# Patient Record
Sex: MALE | Race: WHITE | NOT HISPANIC OR LATINO | URBAN - METROPOLITAN AREA
[De-identification: names, ages, dates, MRNs, and addresses within clinical notes are randomized per-mention and may not be internally consistent; named-entity substitution may affect disease eponyms.]

---

## 2021-02-23 ENCOUNTER — EMERGENCY (EMERGENCY)
Facility: HOSPITAL | Age: 36
LOS: 0 days | Discharge: HOME | End: 2021-02-23
Attending: EMERGENCY MEDICINE | Admitting: EMERGENCY MEDICINE
Payer: COMMERCIAL

## 2021-02-23 VITALS
HEART RATE: 80 BPM | TEMPERATURE: 99 F | DIASTOLIC BLOOD PRESSURE: 77 MMHG | OXYGEN SATURATION: 100 % | RESPIRATION RATE: 18 BRPM | HEIGHT: 67 IN | WEIGHT: 182.1 LBS | SYSTOLIC BLOOD PRESSURE: 132 MMHG

## 2021-02-23 DIAGNOSIS — K64.9 UNSPECIFIED HEMORRHOIDS: ICD-10-CM

## 2021-02-23 DIAGNOSIS — Z79.899 OTHER LONG TERM (CURRENT) DRUG THERAPY: ICD-10-CM

## 2021-02-23 DIAGNOSIS — K64.8 OTHER HEMORRHOIDS: ICD-10-CM

## 2021-02-23 PROCEDURE — 99283 EMERGENCY DEPT VISIT LOW MDM: CPT

## 2021-02-23 RX ORDER — LIDOCAINE/HYDROCORTISONE AC 3 %-0.5 %
1 CREAM WITH APPLICATOR RECTAL
Qty: 10 | Refills: 0
Start: 2021-02-23 | End: 2021-02-27

## 2021-02-23 RX ORDER — LIDOCAINE HCL 20 MG/ML
10 VIAL (ML) INJECTION ONCE
Refills: 0 | Status: COMPLETED | OUTPATIENT
Start: 2021-02-23 | End: 2021-02-23

## 2021-02-23 RX ADMIN — Medication 10 MILLILITER(S): at 03:35

## 2021-02-23 NOTE — ED PROVIDER NOTE - OBJECTIVE STATEMENT
36 y.o M w/ pmhx constipation p/w hemorrhoid that he felt since Thursday. Pt has had these in the past and they go away spontaneously after a couple of days. This time, the pain has been persistent and pt now unable to sleep. No abd pain, no blood in stool, no n/v, no fevers. Pt states he has tried creams and suppositories without relief.

## 2021-02-23 NOTE — ED PROVIDER NOTE - CARE PROVIDER_API CALL
Bryan Hendricks)  ColonRectal Surgery  [address] [place] [address], [address] [place] [address]  Phone: [phone]  Fax: [phone]  Follow Up Time: 1-3 Days

## 2021-02-23 NOTE — ED PROVIDER NOTE - PATIENT PORTAL LINK FT
You can access the FollowMyHealth Patient Portal offered by [facility] by registering at the following website: [email or web address]. By joining [email or web address]’s FollowMyHealth portal, you will also be able to view your health information using other applications (apps) compatible with our system.

## 2021-02-23 NOTE — ED PROVIDER NOTE - NSFOLLOWUPINSTRUCTIONS_ED_ALL_ED_FT
WHAT YOU NEED TO KNOW:    A thrombosed hemorrhoid happens when blood clots become trapped inside your hemorrhoid. It is a common complication of hemorrhoids. Your hemorrhoid may suddenly look swollen or blue and feel very painful.     DISCHARGE INSTRUCTIONS:    Return to the emergency department if:   •Your pain does not get better after you take medicine for pain.  •You have heavy bleeding from your anus that fills 1 or more sanitary pads in 1 hour.    Contact your healthcare provider if:   •You have a fever.   •You have pus or a foul-smelling odor coming from your incision.   •You have questions or concerns about your condition or care.    Medicines: You may need any of the following:   •Medicine may be given to decrease pain, swelling, and itching. The medicine may come as a pad, cream, or ointment.   •Acetaminophen decreases pain and fever. It is available without a doctor's order. Ask how much to take and how often to take it. Follow directions. Acetaminophen can cause liver damage if not taken correctly.  •Stool softeners help treat or prevent constipation.   •NSAIDs, such as ibuprofen, help decrease swelling, pain, and fever. NSAIDs can cause stomach bleeding or kidney problems in certain people. If you take blood thinner medicine, always ask your healthcare provider if NSAIDs are safe for you. Always read the medicine label and follow directions.  •Take your medicine as directed. Contact your healthcare provider if you think your medicine is not helping or if you have side effects. Tell him or her if you are allergic to any medicine. Keep a list of the medicines, vitamins, and herbs you take. Include the amounts, and when and why you take them. Bring the list or the pill bottles to follow-up visits. Carry your medicine list with you in case of an emergency.    Care for your wound as directed: Do the following if your healthcare provider has made an incision in your hemorrhoid:   •Remove your bandage in 6 hours or as directed.   •Ask your healthcare provider if you need to replace your bandage. If he tells you to, pat dry the area after your sitz bath. Put on new, clean bandages as directed. Change your bandages when they get wet or dirty. Wear a sanitary pad to absorb bleeding.     Keep your anal area clean: Gently wash the area with warm water daily. Soap may irritate the area. After a bowel movement, wipe with moist towelettes or wet toilet paper. Dry toilet paper can irritate the area.     Take a sitz bath as directed: A sitz bath can help decrease pain and swelling, and help keep the area clean. Take a sitz bath 3 times a day, and after each bowel movement. Fill a bathtub with 4 to 6 inches of warm water. You may also use a sitz bath pan that fits inside a toilet bowl. Sit in the sitz bath for 15 minutes.     Apply ice on your anus for 15 to 20 minutes every hour or as directed: Use an ice pack, or put crushed ice in a plastic bag. Cover it with a towel before you apply it to your anus. Ice helps prevent tissue damage and decreases swelling and pain.    Prevent hemorrhoids:   •Do not strain to have a bowel movement. Do not sit on the toilet too long. These actions increase pressure in your rectum and anus.   •Drink plenty of liquids. Liquids can help prevent constipation. Ask how much liquid to drink each day and which liquids are best for you.   •Eat a variety of high-fiber foods. Examples include fruits, vegetables, and whole grains. Ask your healthcare provider how much fiber you need each day. You may need to take a fiber supplement.   •Exercise as directed. Exercise, such as walking, may make it easier to have a bowel movement. Ask your healthcare provider to help you create an exercise plan.   •Avoid heavy lifting. This can cause straining and increase your risk for another thrombosed hemorrhoid.     Follow up with your healthcare provider as directed: Write down your questions so you remember to ask them during your visits.

## 2021-02-23 NOTE — ED PROVIDER NOTE - PROGRESS NOTE DETAILS
BI: thrombosed hemorrhoid outside of window for excision > 96 hrs. Pt given lidocaine cream here and prescribed applicator to pharmacy. Instructed to follow up with Colorectal surgery as pt has had this problem in the past.

## 2021-02-23 NOTE — ED PROVIDER NOTE - PHYSICAL EXAMINATION
CONSTITUTIONAL: well-appearing, in NAD  SKIN: Warm dry, normal skin turgor  HEAD: NCAT  EYES: EOMI, PERRLA, no scleral icterus, conjunctiva pink  ABD: soft, non-tender, non-distended, no rebound or guarding.  RECTAL: chaperoned by NNAMDI Bowser: thrombosed tender hemorrhoid, no active bleeding, no ulcers.  EXT: Full ROM  NEURO: normal motor. normal sensory. Normal gait.  PSYCH: Cooperative, appropriate.

## 2021-02-23 NOTE — ED ADULT NURSE NOTE - OBJECTIVE STATEMENT
patient complaints of hemorrhoids are painful x 4 day.  Patient reports diarrhea but denies bleeding.

## 2021-02-23 NOTE — ED PROVIDER NOTE - CLINICAL SUMMARY MEDICAL DECISION MAKING FREE TEXT BOX
37 yo M, hx of recurrent hemorrhoid here for assessment of pain to hemorrhoid x 5 days -- not improving with anusol suppositories. No fever, chills.    VS normal, patient in no distress, has small thrombosed hemorrhoid, no signs of rectal or anal abscess, clear lungs, RRR, soft, NT, ND abdomen.    Hemorrhoid is greater than 72 hours thrombosed, therefore will not excise.    Advised on topical lido, sitz baths, hydrocortisone suppositories, colorectal follow up.

## 2021-02-25 PROBLEM — Z00.00 ENCOUNTER FOR PREVENTIVE HEALTH EXAMINATION: Status: ACTIVE | Noted: 2021-02-25

## 2021-03-02 ENCOUNTER — APPOINTMENT (OUTPATIENT)
Dept: SURGERY | Facility: CLINIC | Age: 36
End: 2021-03-02
Payer: COMMERCIAL

## 2021-03-02 VITALS
HEART RATE: 63 BPM | HEIGHT: 67 IN | SYSTOLIC BLOOD PRESSURE: 110 MMHG | WEIGHT: 185 LBS | DIASTOLIC BLOOD PRESSURE: 68 MMHG | BODY MASS INDEX: 29.03 KG/M2 | TEMPERATURE: 97.7 F

## 2021-03-02 DIAGNOSIS — K64.5 PERIANAL VENOUS THROMBOSIS: ICD-10-CM

## 2021-03-02 DIAGNOSIS — K64.9 UNSPECIFIED HEMORRHOIDS: ICD-10-CM

## 2021-03-02 PROBLEM — Z78.9 OTHER SPECIFIED HEALTH STATUS: Chronic | Status: ACTIVE | Noted: 2021-02-25

## 2021-03-02 PROCEDURE — 99203 OFFICE O/P NEW LOW 30 MIN: CPT

## 2021-03-02 PROCEDURE — 99072 ADDL SUPL MATRL&STAF TM PHE: CPT

## 2021-03-02 NOTE — PHYSICAL EXAM
[Excoriation] : excoriations [Fistula] : no fistulas [Wart] : no warts [Ulcer ___ cm] : no ulcers [Pilonidal Sinus Draining] : no pilonidal sinus drainage [Tender, Swollen] : tender, swollen [Thrombosed] : that was thrombosed [Skin Tags] : residual hemorrhoidal skin tags were noted [Normal] : was normal [None] : there was no rectal mass  [Calm] : calm [de-identified] : Healed pilonidal incision [de-identified] : Healthy male, NAD [de-identified] : Full range of motion [de-identified] : No focal deficits.

## 2021-03-02 NOTE — ASSESSMENT
[FreeTextEntry1] : Mr. WILSON as the resolving thrombosed external hemorrhoid. He is instructed to continue using a cream for one more week. In addition continue the stool softeners and drink plenty of water. He'll return to see me in 3 weeks or certainly sooner if any problems arise.

## 2021-03-23 ENCOUNTER — APPOINTMENT (OUTPATIENT)
Dept: SURGERY | Facility: CLINIC | Age: 36
End: 2021-03-23

## 2021-06-25 ENCOUNTER — TRANSCRIPTION ENCOUNTER (OUTPATIENT)
Age: 36
End: 2021-06-25

## 2021-12-13 ENCOUNTER — EMERGENCY (EMERGENCY)
Facility: HOSPITAL | Age: 36
LOS: 0 days | Discharge: HOME | End: 2021-12-13
Attending: EMERGENCY MEDICINE | Admitting: EMERGENCY MEDICINE
Payer: COMMERCIAL

## 2021-12-13 VITALS
TEMPERATURE: 98 F | DIASTOLIC BLOOD PRESSURE: 85 MMHG | OXYGEN SATURATION: 99 % | HEART RATE: 64 BPM | WEIGHT: 181 LBS | RESPIRATION RATE: 18 BRPM | HEIGHT: 67 IN | SYSTOLIC BLOOD PRESSURE: 135 MMHG

## 2021-12-13 DIAGNOSIS — M79.645 PAIN IN LEFT FINGER(S): ICD-10-CM
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
## 2021-12-13 DIAGNOSIS — M79.642 PAIN IN LEFT HAND: ICD-10-CM
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
## 2021-12-13 PROCEDURE — 99283 EMERGENCY DEPT VISIT LOW MDM: CPT

## 2021-12-13 PROCEDURE — 73130 X-RAY EXAM OF HAND: CPT | Mod: 26,LT

## 2021-12-13 NOTE — ED PROVIDER NOTE - NSFOLLOWUPINSTRUCTIONS_ED_ALL_ED_FT
Please follow up with an orthopedist in 1-2 days.  Please use ibuprofen or acetaminophen as needed for pain.  Please return to the emergency department if you have worsening pain, change in color or feeling to your fingers, or any other symptoms.      Hand Pain    Many things can cause hand pain. Some common causes are:  •An injury.  •Repeating the same movement with your hand over and over (overuse).  •Osteoporosis.  •Arthritis.  •Lumps in the tendons or joints of the hand and wrist (ganglion cysts).  •Nerve compression syndromes (carpal tunnel syndrome).  •Inflammation of the tendons (tendinitis).  •Infection.    Follow these instructions at home:    Pay attention to any changes in your symptoms. Take these actions to help with your discomfort:    Managing pain, stiffness, and swelling   •Take over-the-counter and prescription medicines only as told by your health care provider.  •Wear a hand splint or support as told by your health care provider.  •If directed, put ice on the affected area:  •Put ice in a plastic bag.  •Place a towel between your skin and the bag.  •Leave the ice on for 20 minutes, 2–3 times a day.    Activity   •Take breaks from repetitive activity often.  •Avoid activities that make your pain worse.  •Minimize stress on your hands and wrists as much as possible.  •Do stretches or exercises as told by your health care provider.  • Do not do activities that make your pain worse.    Contact a health care provider if:  •Your pain does not get better after a few days of self-care.  •Your pain gets worse.  •Your pain affects your ability to do your daily activities.    Get help right away if:  •Your hand becomes warm, red, or swollen.  •Your hand is numb or tingling.  •Your hand is extremely swollen or deformed.  •Your hand or fingers turn white or blue.  •You cannot move your hand, wrist, or fingers.    Summary  •Many things can cause hand pain.  •Contact your health care provider if your pain does not get better after a few days of self care.  •Minimize stress on your hands and wrists as much as possible.  • Do not do activities that make your pain worse.    This information is not intended to replace advice given to you by your health care provider. Make sure you discuss any questions you have with your health care provider.

## 2021-12-13 NOTE — ED PROVIDER NOTE - PROGRESS NOTE DETAILS
ADDENDUM by Janis Rodriguez M.D.: I received sign-off from Dr. ART Tariq. 36yoM with L 4th MCP pain during judo, I was to f/u Xray. Pt confirms same hx to me, states happened 2 days ago, mild pain only when fully flexed. On exam, NAD, well appearing, sitting comfortably in chair, no WOB, full finger flex/extension/adduction, nml warmth/color/sensation, no specific TTP/stepoff/deformity. Xray unremarkable. Was placed finger splint and pt declines, states he will take it off anyway. ACE wrapped. Patient is well appearing, NAD, afebrile, hemodynamically stable. Any available tests and studies were discussed with patient. Discharged with instructions in further symptomatic care, return precautions, and need for ortho f/u.

## 2021-12-13 NOTE — ED PROVIDER NOTE - CARE PROVIDER_API CALL
Joesph Loya)  Orthopaedic Surgery  [address] [place] [address]  Phone: [phone]  Fax: [phone]  Follow Up Time: 1-3 Days

## 2021-12-13 NOTE — ED PROVIDER NOTE - PHYSICAL EXAMINATION
VSS, awake, alert, no skin lacerations or abrasions. LUE; no shoulder, elbow, wrist tenderness, hand; appears symmetrical, no gross deformity, crepitus, swelling, ttp over 4th MCP, no warmth, erythema, induration, fluctuance, lymphangitis or purulence, ROM intact, no pain with passive ROM, compartments non-tense, sensory and motor function to radial, median and ulnar nerve intact. AB/AD duction, flexion, extension of digits and opposition of thumb normal, NV intact, capillary refill <3 seconds, 2+ radial pulses, pain not out of proportion to exam not appreciated.

## 2021-12-13 NOTE — ED PROVIDER NOTE - OBJECTIVE STATEMENT
35 y/o male no sig PMH p/w left 4th MCP pain x 2 days after engaging in Judo, sx are constant, worse with certain movements and position, better with rest, denies fever, tactile temp, chills, paresthesias, focal weakness, or other associated complaints at present.     Old chart reviewed.  I have reviewed and agree with the initial nursing note, except as documented in my note.

## 2021-12-13 NOTE — ED PROVIDER NOTE - PATIENT PORTAL LINK FT
You can access the FollowMyHealth Patient Portal offered by [facility] by registering at the following website: [email or web address]. By joining [employer]’s FollowMyHealth portal, you will also be able to view your health information using other applications (apps) compatible with our system.

## 2022-01-28 ENCOUNTER — EMERGENCY (EMERGENCY)
Facility: HOSPITAL | Age: 37
LOS: 0 days | Discharge: HOME | End: 2022-01-29
Attending: EMERGENCY MEDICINE | Admitting: EMERGENCY MEDICINE
Payer: COMMERCIAL

## 2022-01-28 VITALS
WEIGHT: 181 LBS | HEART RATE: 55 BPM | TEMPERATURE: 97 F | RESPIRATION RATE: 18 BRPM | SYSTOLIC BLOOD PRESSURE: 110 MMHG | HEIGHT: 67 IN | OXYGEN SATURATION: 100 % | DIASTOLIC BLOOD PRESSURE: 74 MMHG

## 2022-01-28 DIAGNOSIS — K64.4 RESIDUAL HEMORRHOIDAL SKIN TAGS: ICD-10-CM

## 2022-01-28 DIAGNOSIS — K64.9 UNSPECIFIED HEMORRHOIDS: ICD-10-CM

## 2022-01-28 PROCEDURE — 99283 EMERGENCY DEPT VISIT LOW MDM: CPT

## 2022-01-29 RX ORDER — IBUPROFEN 200 MG
800 TABLET ORAL ONCE
Refills: 0 | Status: COMPLETED | OUTPATIENT
Start: 2022-01-29 | End: 2022-01-29

## 2022-01-29 RX ORDER — LIDOCAINE/HYDROCORTISONE AC 3 %-0.5 %
1 CREAM WITH APPLICATOR RECTAL
Qty: 10 | Refills: 0
Start: 2022-01-29 | End: 2022-02-02

## 2022-01-29 RX ADMIN — Medication 800 MILLIGRAM(S): at 01:13

## 2022-01-29 NOTE — ED PROVIDER NOTE - NSFOLLOWUPCLINICS_GEN_ALL_ED_FT
[facility]  Surgery  [address] [place] [address]  Phone: [phone]  Fax:   Follow Up Time: 1-3 Days

## 2022-01-29 NOTE — ED PROVIDER NOTE - NS ED ROS FT
Constitutional: (-) fever  Eyes/ENT: (-) blurry vision, (-) epistaxis  Cardiovascular: (-) chest pain, (-) syncope  Respiratory: (-) cough, (-) shortness of breath  Gastrointestinal: (-) vomiting, (-) diarrhea, (+) hemorrhoids  Musculoskeletal: (-) neck pain, (-) back pain, (-) joint pain  Integumentary: (-) rash, (-) edema  Neurological: (-) headache, (-) altered mental status  Psychiatric: (-) hallucinations  Allergic/Immunologic: (-) pruritus

## 2022-01-29 NOTE — ED PROVIDER NOTE - PROGRESS NOTE DETAILS
Discussed conservative treatment with pt vs incision. Hemorrhoid is small, will do conservative treatment as this was successful in the past. Lido/hydrocortisone ointment sent to pharmacy. Surgery clinic follow up. Ready for dc.

## 2022-01-29 NOTE — ED PROVIDER NOTE - NSFOLLOWUPINSTRUCTIONS_ED_ALL_ED_FT
Hemorrhoids    WHAT YOU NEED TO KNOW:    Hemorrhoids are swollen blood vessels inside your rectum (internal hemorrhoids) or on your anus (external hemorrhoids). Sometimes a hemorrhoid may prolapse. This means it extends out of your anus.    DISCHARGE INSTRUCTIONS:    Return to the emergency department if:   •You have severe pain in your rectum or around your anus.  •You have severe pain in your abdomen and you are vomiting.   •You have bleeding from your anus that soaks through your underwear.     Contact your healthcare provider if:   •You have frequent and painful bowel movements.  •Your hemorrhoid looks or feels more swollen than usual.   •You do not have a bowel movement for 2 days or more.   •You see or feel tissue coming through your anus.   •You have questions or concerns about your condition or care.    Medicines: You may need any of the following:   •A pad, cream, or ointment can help decrease pain, swelling, and itching.   •Stool softeners help treat or prevent constipation.   •NSAIDs, such as ibuprofen, help decrease swelling, pain, and fever. NSAIDs can cause stomach bleeding or kidney problems in certain people. If you take blood thinner medicine, always ask your healthcare provider if NSAIDs are safe for you. Always read the medicine label and follow directions.  •Take your medicine as directed. Contact your healthcare provider if you think your medicine is not helping or if you have side effects. Tell him or her if you are allergic to any medicine. Keep a list of the medicines, vitamins, and herbs you take. Include the amounts, and when and why you take them. Bring the list or the pill bottles to follow-up visits. Carry your medicine list with you in case of an emergency.    Manage your symptoms:   •Apply ice on your anus for 15 to 20 minutes every hour or as directed. Use an ice pack, or put crushed ice in a plastic bag. Cover it with a towel before you apply it to your anus. Ice helps prevent tissue damage and decreases swelling and pain.  •Take a sitz bath. Fill a bathtub with 4 to 6 inches of warm water. You may also use a sitz bath pan that fits inside a toilet bowl. Sit in the sitz bath for 15 minutes. Do this 3 times a day, and after each bowel movement. The warm water can help decrease pain and swelling.   •Keep your anal area clean. Gently wash the area with warm water daily. Soap may irritate the area. After a bowel movement, wipe with moist towelettes or wet toilet paper. Dry toilet paper can irritate the area.     Prevent hemorrhoids:   •Do not strain to have a bowel movement. Do not sit on the toilet too long. These actions can increase pressure on the tissues in your rectum and anus.   •Drink plenty of liquids. Liquids can help prevent constipation. Ask how much liquid to drink each day and which liquids are best for you.   •Eat a variety of high-fiber foods. Examples include fruits, vegetables, and whole grains. Ask your healthcare provider how much fiber you need each day. You may need to take a fiber supplement.   •Exercise as directed. Exercise, such as walking, may make it easier to have a bowel movement. Ask your healthcare provider to help you create an exercise plan.   •Do not have anal sex. Anal sex can weaken the skin around your rectum and anus.   •Avoid heavy lifting. This can cause straining and increase your risk for another hemorrhoid.

## 2022-01-29 NOTE — ED PROVIDER NOTE - CLINICAL SUMMARY MEDICAL DECISION MAKING FREE TEXT BOX
patient remained stable in ED, and improved well. Patient remained awake, alert, ambulatory and comfortable, tolerated PO. Discussed with patient in detail about the need for close outpatient follow up and the need to return to ED for any persistent, or worsening symptoms, for any new symptoms/concerns. patient verbalized understanding and agreed. patient is given detail aftercare instructions and is instructed well to f/u as outpatient for further care.

## 2022-01-29 NOTE — ED PROVIDER NOTE - PATIENT PORTAL LINK FT
You can access the FollowMyHealth Patient Portal offered by [facility] by registering at the following website: [email or web address]. By joining [employer]’s FollowMyHealth portal, you will also be able to view your health information using other applications (apps) compatible with our system.

## 2022-01-29 NOTE — ED PROVIDER NOTE - PHYSICAL EXAMINATION
Vital Signs: I have reviewed the initial vital signs.  Constitutional: well-nourished, appears stated age, no acute distress  Cardiovascular: regular rate, regular rhythm, well-perfused extremities  Respiratory: unlabored respiratory effort, clear to auscultation bilaterally  Gastrointestinal: soft, non-tender abdomen. (+) Rectal exam: L small external hemorrhoid  Musculoskeletal: supple neck, no lower extremity edema  Integumentary: warm, dry, no rash  Neurologic: awake, alert, extremities’ motor and sensory functions grossly intact  Psychiatric: appropriate mood, appropriate affect

## 2022-01-29 NOTE — ED PROVIDER NOTE - OBJECTIVE STATEMENT
The pt is a 37y M w/ hx of hemorrhoids in the past presenting for same issue. States he started feeling discomfort yesterday. In the past used an ointment which relieved his symptoms. Denies fever, headache, chest pain, SOB, N/V, abdominal pain, diarrhea.

## 2022-01-29 NOTE — ED PROVIDER NOTE - ATTENDING CONTRIBUTION TO CARE
Patient is c/o painful hemorrhoid, denies f/c/n/v/abd pain. Patient with h/o hemorrhoids, and whenever has pain, uses the ointment and had helped. Patient Denies any  symptoms.   Vitals reviewed.   Patient is awake, alert, answering questions appropriately, appears comfortable and not in any distress.  Lungs: CTA, no wheezing, no crackles.  Abd: +BS, NT, ND, soft, no rebound, no guarding. No CVA tenderness, no rash.  Rectal exam: Done by Dr. Solomon, and supervised by me.   +small about 1 cm x 1 cm tender hemorrhoid, no erythema, no crepitus, does not appear thrombosed, no discharge noted. No rectal bleeding. No perirectal abscess noted.   A/P: Inflamed external hemorrhoid,   Hemorrhoidal care,   close outpatient follow up.

## 2022-02-16 ENCOUNTER — APPOINTMENT (OUTPATIENT)
Dept: SURGERY | Facility: CLINIC | Age: 37
End: 2022-02-16

## 2022-11-21 ENCOUNTER — NON-APPOINTMENT (OUTPATIENT)
Age: 37
End: 2022-11-21

## 2022-12-06 NOTE — ED ADULT NURSE NOTE - CAS TRG GEN SKIN CONDITION
Warm Double O-Z Plasty Text: The defect edges were debeveled with a #15 scalpel blade.  Given the location of the defect, shape of the defect and the proximity to free margins a Double O-Z plasty (double transposition flap) was deemed most appropriate.  Using a sterile surgical marker, the appropriate transposition flaps were drawn incorporating the defect and placing the expected incisions within the relaxed skin tension lines where possible. The area thus outlined was incised deep to adipose tissue with a #15 scalpel blade.  The skin margins were undermined to an appropriate distance in all directions utilizing iris scissors.  Hemostasis was achieved with electrocautery.  The flaps were then transposed into place, one clockwise and the other counterclockwise, and anchored with interrupted buried subcutaneous sutures.

## 2023-07-18 ENCOUNTER — NON-APPOINTMENT (OUTPATIENT)
Age: 38
End: 2023-07-18

## 2024-11-09 ENCOUNTER — NON-APPOINTMENT (OUTPATIENT)
Age: 39
End: 2024-11-09

## 2025-03-04 NOTE — ED ADULT TRIAGE NOTE - ARRIVAL FROM
"Austin Melendez  Your attached labs are overall within normal limits but your cholesterol levels are a bit worse.  Let's go ahead with that CT coronary calcium score test.  I will place an order and you call radiology to make appointment: [phone].    The 10-year ASCVD risk score (Collin KOROMA, et al., 2019) is: 5.3%    Values used to calculate the score:      Age: 51 years      Sex: Male      Is Non- : No      Diabetic: No      Tobacco smoker: No      Systolic Blood Pressure: 125 mmHg      Is BP treated: Yes      HDL Cholesterol: 38 mg/dL      Total Cholesterol: 204 mg/dL     Please contact the office with any questions or concerns.    Kaitlin Perez \"Jaquan\" CRUZ Mcghee    " [employer] [facility]